# Patient Record
Sex: MALE | Race: BLACK OR AFRICAN AMERICAN | NOT HISPANIC OR LATINO | Employment: UNEMPLOYED | URBAN - METROPOLITAN AREA
[De-identification: names, ages, dates, MRNs, and addresses within clinical notes are randomized per-mention and may not be internally consistent; named-entity substitution may affect disease eponyms.]

---

## 2017-03-16 ENCOUNTER — OFFICE VISIT (OUTPATIENT)
Dept: FAMILY MEDICINE | Facility: CLINIC | Age: 37
End: 2017-03-16

## 2017-03-16 VITALS
WEIGHT: 285.4 LBS | OXYGEN SATURATION: 96 % | BODY MASS INDEX: 37.91 KG/M2 | DIASTOLIC BLOOD PRESSURE: 90 MMHG | TEMPERATURE: 98.3 F | RESPIRATION RATE: 18 BRPM | HEART RATE: 88 BPM | SYSTOLIC BLOOD PRESSURE: 158 MMHG

## 2017-03-16 DIAGNOSIS — B35.6 TINEA CRURIS: Primary | ICD-10-CM

## 2017-03-16 NOTE — MR AVS SNAPSHOT
After Visit Summary   3/16/2017    Chandni Kirk    MRN: [id number]           Patient Information     Date Of Birth          1980        Visit Information        Provider Department      3/16/2017 4:00 PM Estela Goodson MD [facility]        Today's Diagnoses     Tinea cruris    -  1      Care Instructions    Here is the plan from today's visit    1. Tinea cruris  If your symptoms do not get better in the next 2-4 weeks, please return to clinic for evaluation.  - Miconazole Nitrate 2 % ointment; Apply topically 2 times daily  Dispense: 56 g; Refill: 0        Tinea Cruris (General)  Tinea cruris is a red, itchy rash caused by a fungal infection. It occurs in skin folds where it is warm and moist. This is often the groin area ( jock itch ) or under the breasts. It commonly starts as a small patch that grows larger. This infection is treated with skin creams or oral medication.  Home care  The following guidelines will help you care for yourself at home:    If you were prescribed a cream, it should be applied exactly as directed. You can buy some antifungal creams without a prescription.    It may take a week before the fungus starts to go away and it can take about 2 to 3 weeks to completely clear. To prevent the rash from coming back, it is important to continue the medicine until the rash is all gone.    Wash the area at least once a day with soap and water. Pat dry and apply medicine. If the rash is in the groin, change underwear daily. If the rash is under your breast, wear a bra to prevent skin-on-skin contact between the breast and the skin of the chest.    Once the rash is gone, keep the area clean and dry to prevent reinfection. If recurrence is a problem, use a medicated antifungal powder daily (available over-the-counter).  Follow-up care  Follow up with your doctor as advised by our staff if the rash is not starting to improve after 10 days of  treatment, or if the rash continues to spread.  When to seek medical care  Get prompt medical attention if any of the following occur:    Increasing pain in the area of the rash    Redness that spreads around the rash    Fluid draining from the rash    Fever of 100.4 F (38 C) or higher, or as directed by your health care provider    1078-8927 [employer]. [address] [place] [address], [place] [address]. All rights reserved. This information is not intended as a substitute for professional medical care. Always follow your healthcare professional's instructions.          Please call or return to clinic if your symptoms don't go away.    Follow up plan  Please make a clinic appointment for follow up with me (YFN GERMAN) or your primary physician Jyotsna Jin in 1 month for follow up if needed.    Thank you for coming to [facility] today.  Lab Testing:  **If you had lab testing today and your results are reassuring or normal they will be mailed to you or sent through [employer] within 7 days.   **If the lab tests need quick action we will call you with the results.  The phone number we will call with results is # [phone] (home) [phone] (work). If this is not the best number please call our clinic and change the number.  Medication Refills:  If you need any refills please call your pharmacy and they will contact us.   If you need to  your refill at a new pharmacy, please contact the new pharmacy directly. The new pharmacy will help you get your medications transferred faster.   Scheduling:  If you have any concerns about today's visit or wish to schedule another appointment please call our office during normal business hours [phone] (8-5:00 M-F)  If a referral was made to a [facility] and you don't get a call from central scheduling please call [phone].  If a Mammogram was ordered for you at [facility] call [phone] to schedule or  change your appointment.  If you had an XRay/CT/Ultrasound/MRI ordered the number is [phone] to schedule or change your radiology appointment.   Medical Concerns:  If you have urgent medical concerns please call [phone] at any time of the day.          Follow-ups after your visit        Who to contact     Please call your clinic at [phone] to:    Ask questions about your health    Make or cancel appointments    Discuss your medicines    Learn about your test results    Speak to your doctor   If you have compliments or concerns about an experience at your clinic, or if you wish to file a complaint, please contact [facility] Patient Relations at [phone] or email us at [email or web address]         Additional Information About Your Visit        [employer] Information     [employer] is an electronic gateway that provides easy, online access to your medical records. With [employer], you can request a clinic appointment, read your test results, renew a prescription or communicate with your care team.     To sign up for [employer] visit the website at [email or web address]   You will be asked to enter the access code listed below, as well as some personal information. Please follow the directions to create your username and password.     Your access code is: [email or web address]  Expires: 2017  4:42 PM     Your access code will  in 90 days. If you need help or a new code, please contact your [facility] or call [phone] for assistance.        Care EveryWhere ID     This is your Care EveryWhere ID. This could be used by other organizations to access your [place] medical records  [id number]        Your Vitals Were     Pulse Temperature Respirations Pulse Oximetry BMI (Body Mass Index)       88 98.3  F (36.8  C) (Oral) 18 96% 37.91 kg/m2        Blood Pressure from Last 3 Encounters:   17 158/90   05/15/13 128/82   04/15/13 132/84     Weight from Last 3 Encounters:   03/16/17 285 lb 6.4 oz (129.5 kg)   05/15/13 254 lb 3.2 oz (115.3 kg)   04/15/13 237 lb (107.5 kg)              Today, you had the following     No orders found for display         Today's Medication Changes          These changes are accurate as of: 3/16/17  4:42 PM.  If you have any questions, ask your nurse or doctor.               Start taking these medicines.        Dose/Directions    Miconazole Nitrate 2 % ointment   Used for:  Tinea cruris   Started by:  Estela Goodson MD        Apply topically 2 times daily   Quantity:  56 g   Refills:  0            Where to get your medicines      These medications were sent to [facility] [place], [place] - [address] [facility] [address]     Phone:  [phone]     Miconazole Nitrate 2 % ointment                Primary Care Provider Office Phone # Fax #    Jyotsna Jin [phone]       [facility] [address] [address]        Thank you!     Thank you for choosing [facility]  for your care. Our goal is always to provide you with excellent care. Hearing back from our patients is one way we can continue to improve our services. Please take a few minutes to complete the written survey that you may receive in the mail after your visit with us. Thank you!             Your Updated Medication List - Protect others around you: Learn how to safely use, store and throw away your medicines at www.disposemymeds.org.          This list is accurate as of: 3/16/17  4:42 PM.  Always use your most recent med list.                   Brand Name Dispense Instructions for use    albuterol 108 (90 BASE) MCG/ACT Inhaler   Generic drug:  albuterol      Inhale 1-2 puffs into the lungs every 6 hours as needed.       Miconazole Nitrate 2 % ointment     56 g    Apply topically 2 times daily       trimethoprim-polymyxin b ophthalmic solution     POLYTRIM    1 Bottle    Apply 1 drop to eye every 4 hours for 7 days.

## 2017-03-16 NOTE — PROGRESS NOTES
Preceptor Attestation:   Patient seen and discussed with the resident. Assessment and plan reviewed with resident and agreed upon.   Supervising Physician:  Roger Nicholson MD  [facility]

## 2017-03-16 NOTE — PATIENT INSTRUCTIONS
Here is the plan from today's visit    1. Tinea cruris  If your symptoms do not get better in the next 2-4 weeks, please return to clinic for evaluation.  - Miconazole Nitrate 2 % ointment; Apply topically 2 times daily  Dispense: 56 g; Refill: 0        Tinea Cruris (General)  Tinea cruris is a red, itchy rash caused by a fungal infection. It occurs in skin folds where it is warm and moist. This is often the groin area ( jock itch ) or under the breasts. It commonly starts as a small patch that grows larger. This infection is treated with skin creams or oral medication.  Home care  The following guidelines will help you care for yourself at home:    If you were prescribed a cream, it should be applied exactly as directed. You can buy some antifungal creams without a prescription.    It may take a week before the fungus starts to go away and it can take about 2 to 3 weeks to completely clear. To prevent the rash from coming back, it is important to continue the medicine until the rash is all gone.    Wash the area at least once a day with soap and water. Pat dry and apply medicine. If the rash is in the groin, change underwear daily. If the rash is under your breast, wear a bra to prevent skin-on-skin contact between the breast and the skin of the chest.    Once the rash is gone, keep the area clean and dry to prevent reinfection. If recurrence is a problem, use a medicated antifungal powder daily (available over-the-counter).  Follow-up care  Follow up with your doctor as advised by our staff if the rash is not starting to improve after 10 days of treatment, or if the rash continues to spread.  When to seek medical care  Get prompt medical attention if any of the following occur:    Increasing pain in the area of the rash    Redness that spreads around the rash    Fluid draining from the rash    Fever of 100.4 F (38 C) or higher, or as directed by your health care provider    8188-3279 [employer] [address]  [place] [address]. All rights reserved. This information is not intended as a substitute for professional medical care. Always follow your healthcare professional's instructions.          Please call or return to clinic if your symptoms don't go away.    Follow up plan  Please make a clinic appointment for follow up with me (YFN GERMAN) or your primary physician Jyotsna Jin in 1 month for follow up if needed.    Thank you for coming to [facility] today.  Lab Testing:  **If you had lab testing today and your results are reassuring or normal they will be mailed to you or sent through [employer] within 7 days.   **If the lab tests need quick action we will call you with the results.  The phone number we will call with results is # [phone] (home) [phone] (work). If this is not the best number please call our clinic and change the number.  Medication Refills:  If you need any refills please call your pharmacy and they will contact us.   If you need to  your refill at a new pharmacy, please contact the new pharmacy directly. The new pharmacy will help you get your medications transferred faster.   Scheduling:  If you have any concerns about today's visit or wish to schedule another appointment please call our office during normal business hours [phone] (8-5:00 M-F)  If a referral was made to a [facility] and you don't get a call from central scheduling please call [phone].  If a Mammogram was ordered for you at [facility] call [phone] to schedule or change your appointment.  If you had an XRay/CT/Ultrasound/MRI ordered the number is [phone] to schedule or change your radiology appointment.   Medical Concerns:  If you have urgent medical concerns please call [phone] at any time of the day.

## 2017-03-16 NOTE — PROGRESS NOTES
HPI:       Chandni Kirk is a 36 year old who presents for the following  Patient presents with:  Elbow Pain: right elbow pain since begining of january   Derm Problem: jock itch      Rash/Lesion     Onset: Itching started 6 weeks ago, patient started topical OTC cream 2 weeks prior.      Description:   Location: Between testicles and penis  Color: Hyperpigmentation surrounded by hypopigmentation  Character: round  Itching (Pruritis): Yes Details: resolved with topical cream  Pain?:no    Progression of Symptoms:  improving    Accompanying Signs & Symptoms:  Fever: no  Body aches or joint pain:  no  Sore throat symptoms:no  Recent cold symptoms: Yes Details: Everyone at work has a cold   History:   Previous similar rash: no    Precipitating factors:   Exposure to similar rash: no  New exposures: no  Recent travel: no  New Medication: no    What makes it better?:  Topical medications     Therapies Tried and outcome:  OTC cream (possibly Clomitrazole), Details: Itching stopped with cream, but lesion is still present (area of hyperpigmentation, size of a fingertip)    Patient also have concerns about his weight.  He knows what to do to lose weight but has trouble doing it.  Has joined [employer], has seen a dietician.      Problem, Medication and Allergy Lists were reviewed and are current.  Patient is an established patient of this clinic.         Review of Systems:   Review of Systems   Constitutional: Negative for chills and fever.   HENT: Negative for congestion.    Respiratory: Negative for cough and shortness of breath.    Cardiovascular: Negative for chest pain.   Genitourinary: Negative for discharge, penile pain, penile swelling, scrotal swelling and testicular pain.   Skin: Positive for rash (hyperpigmented lesion on groin).   Psychiatric/Behavioral: The patient is not nervous/anxious.              Physical Exam:   Patient Vitals for the past 24 hrs:   BP Temp Temp src Pulse Resp SpO2 Weight    03/16/17 1600 158/90 98.3  F (36.8  C) Oral 88 18 96 % 285 lb 6.4 oz (129.5 kg)     Body mass index is 37.91 kg/(m^2).  Vitals were reviewed and were normal     Physical Exam   Constitutional: He is oriented to person, place, and time. He appears well-developed. No distress.   HENT:   Head: Normocephalic.   Eyes: Conjunctivae are normal.   Neck: Normal range of motion. Neck supple.   Cardiovascular: Normal rate.    Pulmonary/Chest: Effort normal.   Abdominal: Soft.   Genitourinary: Penis normal. No penile tenderness.   Genitourinary Comments: Small hyperpigmented round/oval lesion approximately 2-3 cm in size on scrotum, under penis, between testicles.  Small ring around lesion that is hypopigmented.   Musculoskeletal: Normal range of motion. He exhibits no edema or tenderness.   Neurological: He is alert and oriented to person, place, and time.   Skin: Skin is warm and dry.   Psychiatric: He has a normal mood and affect. His behavior is normal. Judgment and thought content normal.   Vitals reviewed.        Results:     No labs ordered.  Assessment and Plan     Chandni was seen today for elbow pain and derm problem.  Patient's lesion most likely a fungal infection since it is hyperpigmented and is improving with current anti-fungal cream.  Differential also includes contact dermatitis.  Psoriasis, lichen sclerosis, candida, and seborrheic dermatitis seem less likely based on exam.  Will give an ointment to help with symptoms.  Patient educated that it may take several weeks for hyperpigmentation to return to normal.    Patient's blood pressure also noted to be elevated during our visit.  I recommended that patient RTC for follow-up for a blood pressure check in next 1-2 weeks.    Diagnoses and all orders for this visit:    Tinea cruris  -     Miconazole Nitrate 2 % ointment; Apply topically 2 times daily      There are no discontinued medications.  Options for treatment and follow-up care were reviewed with the  patient. Chandni Kirk  engaged in the decision making process and verbalized understanding of the options discussed and agreed with the final plan.    Estela Goodson M.D.  PGY-2, Family Medicine

## 2017-03-19 ASSESSMENT — ENCOUNTER SYMPTOMS
FEVER: 0
CHILLS: 0
NERVOUS/ANXIOUS: 0
SHORTNESS OF BREATH: 0
COUGH: 0

## 2017-03-22 PROBLEM — B35.6 TINEA CRURIS: Status: ACTIVE | Noted: 2017-03-22

## 2023-12-10 NOTE — TELEPHONE ENCOUNTER
DIAGNOSIS: LT SHOULDER PAINFUL 3 MONTHS AGO / SELF REFERRED / [employer] / NO XRAYS     APPOINTMENT DATE: 12.13.23   NOTES STATUS DETAILS   MEDICATION LIST Internal

## 2023-12-12 DIAGNOSIS — M25.512 LEFT SHOULDER PAIN: Primary | ICD-10-CM

## 2023-12-13 ENCOUNTER — OFFICE VISIT (OUTPATIENT)
Dept: ORTHOPEDICS | Facility: CLINIC | Age: 43
End: 2023-12-13
Payer: COMMERCIAL

## 2023-12-13 ENCOUNTER — PRE VISIT (OUTPATIENT)
Dept: ORTHOPEDICS | Facility: CLINIC | Age: 43
End: 2023-12-13

## 2023-12-13 ENCOUNTER — ANCILLARY PROCEDURE (OUTPATIENT)
Dept: GENERAL RADIOLOGY | Facility: CLINIC | Age: 43
End: 2023-12-13
Attending: FAMILY MEDICINE
Payer: COMMERCIAL

## 2023-12-13 DIAGNOSIS — M54.12 CERVICAL RADICULOPATHY: Primary | ICD-10-CM

## 2023-12-13 DIAGNOSIS — M54.12 CERVICAL RADICULOPATHY: ICD-10-CM

## 2023-12-13 DIAGNOSIS — M25.512 LEFT SHOULDER PAIN: ICD-10-CM

## 2023-12-13 PROCEDURE — 72040 X-RAY EXAM NECK SPINE 2-3 VW: CPT | Mod: GC | Performed by: RADIOLOGY

## 2023-12-13 PROCEDURE — 99204 OFFICE O/P NEW MOD 45 MIN: CPT | Performed by: FAMILY MEDICINE

## 2023-12-13 PROCEDURE — 73030 X-RAY EXAM OF SHOULDER: CPT | Mod: LT | Performed by: RADIOLOGY

## 2023-12-13 NOTE — LETTER
12/13/2023      RE: Chandni Kirk  [address]  [place] [address]     Dear Colleague,    Thank you for referring your patient, Chandni Kirk, to the [facility] [place]. Please see a copy of my visit note below.    CHIEF COMPLAINT:  Pain of the Left Shoulder       HISTORY OF PRESENT ILLNESS  Mr. Kirk is a pleasant 43 year old male who presents to clinic today with left shoulder pain.  Chandni has been experiencing pain on the top of his left shoulder for many months.  No single inciting event that he can recall.  This is improving somewhat over time as he has adjusted his lifting routine (he engages in strength training for exercise).    However, his main issue is bilateral arm pain and weakness.  Left worse than right.  This starts in his bicep and travels down his arm to his hand.  This has been present for the past year, started after an episode of squatting in which he was squatting with a heavy barbell.  He feels as if his left arm is weak at times, he does notice distinct weakness with gripping and with lifting weights.  He does not experience numbness or tingling.    Additional history: as documented    MEDICAL HISTORY  Patient Active Problem List   Diagnosis    Health Care Home    Adjustment reaction    Depressive disorder, not elsewhere classified    Intermittent asthma    Obesity    Tinea cruris       Current Outpatient Medications   Medication Sig Dispense Refill    albuterol (PROAIR HFA) 108 (90 BASE) MCG/ACT inhaler Inhale 1-2 puffs into the lungs every 6 hours as needed.      Miconazole Nitrate 2 % ointment Apply topically 2 times daily 56 g 0    trimethoprim-polymyxin b (POLYTRIM) ophthalmic solution Apply 1 drop to eye every 4 hours for 7 days. 1 Bottle 0       Allergies   Allergen Reactions    Seafood Unknown       No family history on file.    Additional medical/Social/Surgical histories reviewed in [facility] and updated as appropriate.         PHYSICAL EXAM  General  - normal appearance, in no obvious distress  Musculoskeletal - left shoulder  - inspection: normal bone and joint alignment, no obvious deformity, no scapular winging, no AC step-off  - palpation: mildly tender AC  - ROM:  painful resisted flexion  - strength: 5/5  strength, 5/5 in all shoulder planes  - special tests:  (-) Speed's  (-) Neer  (+) Hawkin's  (-) Bandar's  Neuro  - no sensory or motor deficit, grossly normal coordination, normal muscle tone               ASSESSMENT & PLAN  Mr. Kirk is a 43 year old male who presents to clinic today with left shoulder pain.  He also has bilateral arm pain and left arm weakness.    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    His shoulder symptoms do seem to be secondary to an AC joint issue, we discussed the repetitive microtrauma nature of weightlifting and that this has likely contributed to this pain.  I am happy that this is improving.  We could consider injection based treatments or physical therapy in the future, if worsening.    As for his arm weakness this does suggest a cervical radicular component.  Given his relative failure of exercise-based treatment and rest over the past year I ordered and independently reviewed an x-ray of his cervical spine.  This does reveal mild arthritic change.  I am ordering an MR of his cervical spine to further investigate.  I will get in touch with him with the results.    It was a pleasure seeing Chandni today.    Tima Perry DO, [facility]  Primary Care Sports Medicine      This note was constructed using Dragon dictation software, please excuse any minor errors in spelling, grammar, or syntax.

## 2023-12-20 ENCOUNTER — THERAPY VISIT (OUTPATIENT)
Dept: PHYSICAL THERAPY | Facility: CLINIC | Age: 43
End: 2023-12-20
Attending: FAMILY MEDICINE
Payer: COMMERCIAL

## 2023-12-20 DIAGNOSIS — M54.12 CERVICAL RADICULOPATHY: ICD-10-CM

## 2023-12-20 PROCEDURE — 97110 THERAPEUTIC EXERCISES: CPT | Mod: GP

## 2023-12-20 PROCEDURE — 97161 PT EVAL LOW COMPLEX 20 MIN: CPT | Mod: GP

## 2023-12-20 NOTE — PROGRESS NOTES
PHYSICAL THERAPY EVALUATION  Type of Visit: Evaluation    See electronic medical record for Abuse and Falls Screening details.    Subjective   Pt notes symptoms in his arm started roughly a year ago, but symptoms have been on and off. He notes symptoms will increase with squats and shoulder elevated positions. He notes restrictions to L shoulder movements. He notes with benching he would get symptoms in his L biceps and would slowly develop into tingling symptoms in his thumb. He notes when symptoms develop, it will peak at 3 minutes and take 20 minutes to completely dissipate.    With his L shoulder he notes he was doing chin ups, previously would have symptoms develop in his shoulder, but would back off before it got worse. On another occasion when it started he noted trying to work through it and it got worse. He also notes pain with laying on his L shoulder and holding into D1 extension position.      Presenting condition or subjective complaint:    Date of onset:      Relevant medical history:   N/A  Dates & types of surgery:  N/A    Prior diagnostic imaging/testing results:     both per report:   Cervical XR:  IMPRESSION:  1. There is no acute osseous abnormality of the cervical spine.   2. No radiographic evidence for significant cervical spondylosis    Shoulder XR: Impression:  1. No acute osseous abnormality.  2. No substantial degenerative change.  3. Mild distal clavicular osteolysis, typically associated with  repetitive microtrauma    Prior therapy history for the same diagnosis, illness or injury:     none    Patient goals for therapy:   Benching and squatting without pain        Objective   CERVICAL SPINE EVALUATION  POSTURE: Standing Posture: Rounded shoulders, Forward head, Thoracic kyphosis increased Also noted increased B scapula protraction in resting position  ROM:  Cervical ROM WFL. No provoking of symptoms with repeated motion testing into flexion, extension, B rotation, and B side bending.  Noted mild limitations to L>R shoulder mobility with increased shoulder shrug and early scapular rotation and elevation during L shoulder elevation. Noted mild discomfort to Anterosuperior portion of L shoulder that was not immediately palpable.    NEURAL TENSION: Cervical WNL assessed L radial, L ulnar, and L median nerves including palpation (all negative)  FLEXIBILITY:  Noted deficits to muscle length to L biceps brachii, L pec major/minor, L Latissimus dorsi    SPECIAL TESTS:  Negative ashley test  PALPATION:  No noted tenderness in L UE - increased tension without palpation noted to palpation of L pec major/minor, L infraspinatus, L UT, L LS  SPINAL SEGMENTAL CONCLUSIONS: Did not assess    Assessment & Plan   CLINICAL IMPRESSIONS  Medical Diagnosis: (P) Cervical radiculopathy    Treatment Diagnosis: (P) L shoulder pain with L UE radicular symptoms   Impression/Assessment:  Patient is a 43 year old male with L shoulder pain with L UE radicular symptoms complaints.  The following significant findings have been identified: Pain, Decreased ROM/flexibility, Decreased joint mobility, Decreased activity tolerance, and Impaired posture. These impairments interfere with their ability to perform recreational activities as compared to previous level of function.     Clinical Decision Making (Complexity):  Clinical Presentation: Stable/Uncomplicated  Clinical Presentation Rationale: based on medical and personal factors listed in PT evaluation  Clinical Decision Making (Complexity): Low complexity    PLAN OF CARE  Treatment Interventions:  Interventions: Manual Therapy, Neuromuscular Re-education, Therapeutic Activity, Therapeutic Exercise    Long Term Goals     PT Goal 1  Goal Identifier: Benching  Goal Description: Pt will return to benching over 300 pounds with minimal to no increase in symptoms in arm or shoulder.  Rationale: to maximize safety and independence with performance of ADLs and functional tasks  Target Date:  03/19/24      Frequency of Treatment: 1x/week  Duration of Treatment: (P) 10 weeks    Education Assessment:   Learner/Method: Patient;Demonstration;Pictures/Video;No Barriers to Learning    Risks and benefits of evaluation/treatment have been explained.   Patient/Family/caregiver agrees with Plan of Care.     Evaluation Time:           Signing Clinician: MICHELLE PERKINS

## 2023-12-21 PROBLEM — M54.12 CERVICAL RADICULOPATHY: Status: ACTIVE | Noted: 2023-12-21

## 2023-12-29 ENCOUNTER — HOSPITAL ENCOUNTER (OUTPATIENT)
Dept: MRI IMAGING | Facility: HOSPITAL | Age: 43
Discharge: HOME | End: 2023-12-29
Attending: FAMILY MEDICINE | Admitting: FAMILY MEDICINE
Payer: COMMERCIAL

## 2023-12-29 DIAGNOSIS — M54.12 CERVICAL RADICULOPATHY: ICD-10-CM

## 2023-12-29 PROCEDURE — 72141 MRI NECK SPINE W/O DYE: CPT

## 2024-01-17 ENCOUNTER — THERAPY VISIT (OUTPATIENT)
Dept: PHYSICAL THERAPY | Facility: CLINIC | Age: 44
End: 2024-01-17
Payer: COMMERCIAL

## 2024-01-17 DIAGNOSIS — M54.12 CERVICAL RADICULOPATHY: Primary | ICD-10-CM

## 2024-01-17 PROCEDURE — 97110 THERAPEUTIC EXERCISES: CPT | Mod: 59 | Performed by: PHYSICAL THERAPIST

## 2024-01-17 PROCEDURE — 97530 THERAPEUTIC ACTIVITIES: CPT | Mod: GP | Performed by: PHYSICAL THERAPIST

## 2024-01-28 ENCOUNTER — HEALTH MAINTENANCE LETTER (OUTPATIENT)
Age: 44
End: 2024-01-28

## 2024-02-16 ENCOUNTER — THERAPY VISIT (OUTPATIENT)
Dept: PHYSICAL THERAPY | Facility: CLINIC | Age: 44
End: 2024-02-16
Payer: COMMERCIAL

## 2024-02-16 DIAGNOSIS — M54.12 CERVICAL RADICULOPATHY: Primary | ICD-10-CM

## 2024-02-16 PROCEDURE — 97530 THERAPEUTIC ACTIVITIES: CPT | Mod: GP

## 2025-02-01 ENCOUNTER — HEALTH MAINTENANCE LETTER (OUTPATIENT)
Age: 45
End: 2025-02-01